# Patient Record
Sex: FEMALE | Race: BLACK OR AFRICAN AMERICAN | NOT HISPANIC OR LATINO | URBAN - METROPOLITAN AREA
[De-identification: names, ages, dates, MRNs, and addresses within clinical notes are randomized per-mention and may not be internally consistent; named-entity substitution may affect disease eponyms.]

---

## 2020-11-03 ENCOUNTER — IMPORTED ENCOUNTER (OUTPATIENT)
Dept: URBAN - METROPOLITAN AREA CLINIC 9 | Facility: CLINIC | Age: 60
End: 2020-11-03

## 2020-11-16 NOTE — PATIENT DISCUSSION
Reviewed visual roland from Dr. Celia Tate office. Pt has a family history of Glaucoma and elevated pressures. Rec: Treatment at this time. Discussed options of drops vs. SLT.  Initiate treatment with Lumigan ou qhs.

## 2020-12-09 NOTE — PATIENT DISCUSSION
SOME RESPONSE TO LUMIGAN HOWEVER PT RAN OUT 1 WEEK AGO.  WOULD LIKE TO TRY SLT.  PLAN OU TODAY.  HOLD LUMIGAN AND WILL HAVE [address] CHECK IOP IN 4-6 WKS.   IF IOP NOT CONTROLLED, ADD BACK LUMIGAN.

## 2020-12-09 NOTE — PROCEDURE NOTE: CLINICAL
PROCEDURE NOTE: SLT #1 OU. Diagnosis: POAG, Mild. Anesthesia: Topical. Prep: Alphagan 0.15%. Prior to treatment, risks/benefits/alternatives discussed including infection, loss of vision, hemorrhage, cataract, glaucoma, retinal tears or detachment. Lens:  SLT laser lens with goniosol. Power: 1.2mJ. Total applications: 603/77. Application 354 degrees. Patient tolerated procedure well. There were no complications. Post-op instructions given. Post-op IOP = 17/19 mmHg. Pennye Hand

## 2020-12-14 ENCOUNTER — IMPORTED ENCOUNTER (OUTPATIENT)
Dept: URBAN - METROPOLITAN AREA CLINIC 9 | Facility: CLINIC | Age: 60
End: 2020-12-14

## 2021-01-13 NOTE — PATIENT DISCUSSION
CONTINUE TO FOLLOW W/ DR BRODERICK EVERY 4-5 MONTHS W/ OCT, HVF AND OPTIC NERVE EVAL.  IF CHANGES, SEND BACK TO DR Sherrine Sever.

## 2021-10-17 ASSESSMENT — VISUAL ACUITY
OS_SC: 20/60 SN
OD_CC: 20/40 - SN
OD_SC: 20/30 -2 SN
OS_CC: 20/25 SN
OD_PH: 20/40 SN
OS_PH: 20/40 SN
OS_CC: 20/40 - SN
OS_SC: 20/30 -2 SN
OD_CC: 20/25 SN
OD_SC: 20/50 SN

## 2021-10-17 ASSESSMENT — TONOMETRY
OD_IOP_MMHG: 32
OS_IOP_MMHG: 16
OS_IOP_MMHG: 29
OD_IOP_MMHG: 18
OS_IOP_MMHG: 20
OD_IOP_MMHG: 16

## 2021-10-17 ASSESSMENT — PACHYMETRY
OD_CT_UM: 606.0
OS_CT_UM: 600.0

## 2022-01-04 ENCOUNTER — DIAGNOSTICS ONLY (OUTPATIENT)
Dept: URBAN - METROPOLITAN AREA CLINIC 13 | Facility: CLINIC | Age: 62
End: 2022-01-04

## 2022-01-04 DIAGNOSIS — H40.1131: ICD-10-CM

## 2022-01-04 PROCEDURE — 92012 INTRM OPH EXAM EST PATIENT: CPT

## 2022-01-04 ASSESSMENT — VISUAL ACUITY
OU_SC: 20/40-2
OD_SC: 20/50
OS_SC: 20/70

## 2022-01-04 ASSESSMENT — TONOMETRY
OS_IOP_MMHG: 18
OD_IOP_MMHG: 18

## 2022-03-28 NOTE — PATIENT DISCUSSION
We recommended going through surgery, they are ready. Pt is wanting to wait for now. Will re-eval at next COMP.

## 2022-06-29 ENCOUNTER — PREPPED CHART (OUTPATIENT)
Dept: URBAN - METROPOLITAN AREA CLINIC 7 | Facility: CLINIC | Age: 62
End: 2022-06-29

## 2022-08-04 ENCOUNTER — ESTABLISHED PATIENT (OUTPATIENT)
Dept: URBAN - METROPOLITAN AREA CLINIC 13 | Facility: CLINIC | Age: 62
End: 2022-08-04

## 2022-08-04 DIAGNOSIS — H40.1131: ICD-10-CM

## 2022-08-04 DIAGNOSIS — H40.1133: ICD-10-CM

## 2022-08-04 PROCEDURE — 92133 CPTRZD OPH DX IMG PST SGM ON: CPT

## 2022-08-04 PROCEDURE — 99213 OFFICE O/P EST LOW 20 MIN: CPT

## 2022-08-04 PROCEDURE — 92083 EXTENDED VISUAL FIELD XM: CPT

## 2022-08-04 ASSESSMENT — VISUAL ACUITY
OU_SC: 20/50
OD_SC: 20/60-1
OS_SC: 20/50-2

## 2022-08-04 ASSESSMENT — TONOMETRY
OS_IOP_MMHG: 47
OD_IOP_MMHG: 47

## 2022-11-10 ENCOUNTER — PREPPED CHART (OUTPATIENT)
Dept: URBAN - METROPOLITAN AREA CLINIC 13 | Facility: CLINIC | Age: 62
End: 2022-11-10

## 2023-01-05 ENCOUNTER — ESTABLISHED PATIENT (OUTPATIENT)
Dept: URBAN - METROPOLITAN AREA CLINIC 13 | Facility: CLINIC | Age: 63
End: 2023-01-05

## 2023-01-05 DIAGNOSIS — H52.223: ICD-10-CM

## 2023-01-05 DIAGNOSIS — E11.3293: ICD-10-CM

## 2023-01-05 DIAGNOSIS — H40.1133: ICD-10-CM

## 2023-01-05 DIAGNOSIS — H25.13: ICD-10-CM

## 2023-01-05 PROCEDURE — 92015 DETERMINE REFRACTIVE STATE: CPT

## 2023-01-05 PROCEDURE — 92250 FUNDUS PHOTOGRAPHY W/I&R: CPT

## 2023-01-05 PROCEDURE — 92014 COMPRE OPH EXAM EST PT 1/>: CPT

## 2023-01-05 ASSESSMENT — TONOMETRY
OD_IOP_MMHG: 46
OD_IOP_MMHG: 42
OS_IOP_MMHG: 43
OD_IOP_MMHG: 43
OS_IOP_MMHG: 48
OS_IOP_MMHG: 48

## 2023-01-05 ASSESSMENT — VISUAL ACUITY
OD_SC: 20/40-1
OS_SC: 20/70-1
OU_SC: 20/40
